# Patient Record
Sex: FEMALE | Race: WHITE | NOT HISPANIC OR LATINO | Employment: FULL TIME | URBAN - NONMETROPOLITAN AREA
[De-identification: names, ages, dates, MRNs, and addresses within clinical notes are randomized per-mention and may not be internally consistent; named-entity substitution may affect disease eponyms.]

---

## 2019-02-17 ENCOUNTER — HOSPITAL ENCOUNTER (EMERGENCY)
Facility: HOSPITAL | Age: 44
Discharge: HOME | End: 2019-02-17
Attending: EMERGENCY MEDICINE | Admitting: EMERGENCY MEDICINE
Payer: COMMERCIAL

## 2019-02-17 VITALS
HEART RATE: 84 BPM | OXYGEN SATURATION: 100 % | SYSTOLIC BLOOD PRESSURE: 106 MMHG | BODY MASS INDEX: 22.2 KG/M2 | WEIGHT: 130 LBS | RESPIRATION RATE: 17 BRPM | HEIGHT: 64 IN | DIASTOLIC BLOOD PRESSURE: 61 MMHG | TEMPERATURE: 98.5 F

## 2019-02-17 DIAGNOSIS — H10.31 ACUTE CONJUNCTIVITIS OF RIGHT EYE, UNSPECIFIED ACUTE CONJUNCTIVITIS TYPE: Primary | ICD-10-CM

## 2019-02-17 DIAGNOSIS — S05.01XA ABRASION OF RIGHT CORNEA, INITIAL ENCOUNTER: ICD-10-CM

## 2019-02-17 PROCEDURE — 99283 EMERGENCY DEPT VISIT LOW MDM: CPT

## 2019-02-17 RX ORDER — TETRACAINE HYDROCHLORIDE 5 MG/ML
2 SOLUTION OPHTHALMIC ONCE
Status: COMPLETED | OUTPATIENT
Start: 2019-02-17 | End: 2019-02-17

## 2019-02-17 RX ORDER — TOBRAMYCIN 3 MG/ML
1 SOLUTION/ DROPS OPHTHALMIC
Qty: 5 ML | Refills: 0 | Status: SHIPPED | OUTPATIENT
Start: 2019-02-17

## 2019-02-17 RX ADMIN — FLUORESCEIN SODIUM 1 STRIP: 1 STRIP OPHTHALMIC at 10:42

## 2019-02-17 RX ADMIN — TETRACAINE HYDROCHLORIDE 2 DROP: 5 SOLUTION OPHTHALMIC at 10:42

## 2019-02-17 NOTE — ED PROVIDER NOTES
History  Chief Complaint   Patient presents with    Eye Pain     Right eye pain and light sensitivity for two days, drainage     Patient presents to the emergency department today for evaluation of right eye symptoms that commenced about 48 hours ago  She complains of feeling a foreign body sensation in the right eye however there is no true history of foreign body  She admits to the eye being generalized blurry  She admits to yellow crusty discharge  She denies any ocular pain or pain with extraocular movements  She is not a contact lens wearer  She has been utilizing an unknown over-the-counter eyedrops  None       History reviewed  No pertinent past medical history  Past Surgical History:   Procedure Laterality Date    TONSILLECTOMY      TUBAL LIGATION         History reviewed  No pertinent family history  I have reviewed and agree with the history as documented  Social History     Tobacco Use    Smoking status: Current Every Day Smoker    Smokeless tobacco: Never Used   Substance Use Topics    Alcohol use: Yes     Comment: rarely    Drug use: Not on file        Review of Systems   Constitutional: Negative  HENT: Negative  Eyes: Positive for photophobia and discharge  Negative for pain, redness, itching and visual disturbance  Respiratory: Negative  Cardiovascular: Negative  Gastrointestinal: Negative  Endocrine: Negative  Genitourinary: Negative  Musculoskeletal: Negative  Skin: Negative  Allergic/Immunologic: Negative  Neurological: Negative  Hematological: Negative  Psychiatric/Behavioral: Negative  All other systems reviewed and are negative  Physical Exam  Physical Exam   Constitutional: She is oriented to person, place, and time  She appears well-developed and well-nourished  No distress  HENT:   Head: Normocephalic     Right Ear: External ear normal    Left Ear: External ear normal    Nose: Nose normal    Mouth/Throat: Oropharynx is clear and moist  No oropharyngeal exudate  Eyes: Pupils are equal, round, and reactive to light  EOM are normal  Right eye exhibits discharge  Left eye exhibits no discharge  No scleral icterus  Extraocular movements intact without pain  There is clear ocular discharge right-sided  There is no scleral injection  No evidence of foreign body on simple examination  Pupils are equal reactive to light and accommodation  No visual field defects are noted  Neck: Normal range of motion  Neck supple  Cardiovascular: Normal rate  Pulmonary/Chest: Effort normal    Musculoskeletal: Normal range of motion  Neurological: She is alert and oriented to person, place, and time  Skin: Skin is warm  Capillary refill takes less than 2 seconds  She is not diaphoretic  Psychiatric: She has a normal mood and affect  Vitals reviewed  Vital Signs  ED Triage Vitals [02/17/19 1017]   Temperature Pulse Respirations Blood Pressure SpO2   98 5 °F (36 9 °C) 78 18 103/68 98 %      Temp Source Heart Rate Source Patient Position - Orthostatic VS BP Location FiO2 (%)   Temporal Monitor Sitting Right arm --      Pain Score       8           Vitals:    02/17/19 1017   BP: 103/68   Pulse: 78   Patient Position - Orthostatic VS: Sitting       Visual Acuity  Visual Acuity      Most Recent Value   Visual acuity R eye is  20/50   Visual acuity Left eye is  20/20   Visual acuity in both eyes is  20/40   Wearing corrective eyewear/lenses?   No   L Pupil Size (mm)  3   R Pupil Size (mm)  3   L Pupil Shape  Round   R Pupil Shape  Round          ED Medications  Medications   tetracaine 0 5 % ophthalmic solution 2 drop (2 drops Right Eye Given by Other 2/17/19 1042)   fluorescein sodium sterile ophthalmic strip 1 strip (1 strip Right Eye Given by Other 2/17/19 1042)       Diagnostic Studies  Results Reviewed     None                 No orders to display              Procedures  Procedures       Phone Contacts  ED Phone Contact    ED Course  ED Course as of Feb 17 1054   Sun Feb 17, 2019   1029 Blood Pressure: 103/68   1029 Temperature: 98 5 °F (36 9 °C)   1029 Pulse: 78   1029 Respirations: 18   1029 SpO2: 98 %   1047 A fluorescein stain exam was completed on the patient's right eye  There was uptake on the sclera in the 10 o'clock position of the right eye  No evidence of ocular foreign bodies  No other corneal abnormalities noted  Stain was washed out with saline  This exam was completed post 2 drops tetracaine administration  Patient tolerated the procedure well  Recommend antibiotic eyedrops as well as over-the-counter lubricant drops follow-up with Ophthalmology within 24 hours  MDM    Disposition  Final diagnoses:   Acute conjunctivitis of right eye, unspecified acute conjunctivitis type   Abrasion of right cornea, initial encounter     Time reflects when diagnosis was documented in both MDM as applicable and the Disposition within this note     Time User Action Codes Description Comment    2/17/2019 10:52 AM Cheryle Legions D Add [H10 31] Acute conjunctivitis of right eye, unspecified acute conjunctivitis type     2/17/2019 10:52 AM Julieta Bauman Add [S05 01XA] Abrasion of right cornea, initial encounter       ED Disposition     ED Disposition Condition Date/Time Comment    Discharge Good Sun Feb 17, 2019 10:52 AM Hadley Gordon discharge to home/self care  Follow-up Information     Follow up With Specialties Details Why Zaira Fine MD Ophthalmology Go in 1 day  [address], [place] [address]  [phone]            Patient's Medications   Discharge Prescriptions    TOBRAMYCIN (TOBREX) 0 3 % SOLN    Administer 1 drop to the right eye every 4 (four) hours while awake       Start Date: 2/17/2019 End Date: --       Order Dose: 1 drop       Quantity: 5 mL    Refills: 0     No discharge procedures on file      ED Provider  Electronically Signed by Kashif Perry PA-C  02/17/19 1053

## 2021-07-27 ENCOUNTER — HOSPITAL ENCOUNTER (EMERGENCY)
Facility: HOSPITAL | Age: 46
Discharge: HOME | End: 2021-07-27
Attending: EMERGENCY MEDICINE
Payer: COMMERCIAL

## 2021-07-27 VITALS
DIASTOLIC BLOOD PRESSURE: 53 MMHG | HEIGHT: 64 IN | SYSTOLIC BLOOD PRESSURE: 93 MMHG | TEMPERATURE: 98 F | HEART RATE: 67 BPM | RESPIRATION RATE: 13 BRPM | WEIGHT: 130.07 LBS | OXYGEN SATURATION: 98 % | BODY MASS INDEX: 22.21 KG/M2

## 2021-07-27 DIAGNOSIS — R51.9 ACUTE NONINTRACTABLE HEADACHE, UNSPECIFIED HEADACHE TYPE: Primary | ICD-10-CM

## 2021-07-27 LAB
EXT PREG TEST URINE: NEGATIVE
EXT. CONTROL ED NAV: NORMAL

## 2021-07-27 PROCEDURE — 93005 ELECTROCARDIOGRAM TRACING: CPT

## 2021-07-27 PROCEDURE — 96365 THER/PROPH/DIAG IV INF INIT: CPT

## 2021-07-27 PROCEDURE — 81025 URINE PREGNANCY TEST: CPT | Performed by: EMERGENCY MEDICINE

## 2021-07-27 PROCEDURE — 99284 EMERGENCY DEPT VISIT MOD MDM: CPT

## 2021-07-27 PROCEDURE — 96375 TX/PRO/DX INJ NEW DRUG ADDON: CPT

## 2021-07-27 PROCEDURE — 99284 EMERGENCY DEPT VISIT MOD MDM: CPT | Performed by: EMERGENCY MEDICINE

## 2021-07-27 PROCEDURE — 96366 THER/PROPH/DIAG IV INF ADDON: CPT

## 2021-07-27 RX ORDER — MAGNESIUM SULFATE HEPTAHYDRATE 40 MG/ML
2 INJECTION, SOLUTION INTRAVENOUS ONCE
Status: COMPLETED | OUTPATIENT
Start: 2021-07-27 | End: 2021-07-27

## 2021-07-27 RX ORDER — ACETAMINOPHEN 325 MG/1
650 TABLET ORAL ONCE
Status: COMPLETED | OUTPATIENT
Start: 2021-07-27 | End: 2021-07-27

## 2021-07-27 RX ORDER — METOCLOPRAMIDE HYDROCHLORIDE 5 MG/ML
10 INJECTION INTRAMUSCULAR; INTRAVENOUS ONCE
Status: COMPLETED | OUTPATIENT
Start: 2021-07-27 | End: 2021-07-27

## 2021-07-27 RX ORDER — KETOROLAC TROMETHAMINE 30 MG/ML
30 INJECTION, SOLUTION INTRAMUSCULAR; INTRAVENOUS ONCE
Status: COMPLETED | OUTPATIENT
Start: 2021-07-27 | End: 2021-07-27

## 2021-07-27 RX ORDER — DIPHENHYDRAMINE HYDROCHLORIDE 50 MG/ML
25 INJECTION INTRAMUSCULAR; INTRAVENOUS ONCE
Status: COMPLETED | OUTPATIENT
Start: 2021-07-27 | End: 2021-07-27

## 2021-07-27 RX ADMIN — METOCLOPRAMIDE HYDROCHLORIDE 10 MG: 5 INJECTION INTRAMUSCULAR; INTRAVENOUS at 14:40

## 2021-07-27 RX ADMIN — DIPHENHYDRAMINE HYDROCHLORIDE 25 MG: 50 INJECTION, SOLUTION INTRAMUSCULAR; INTRAVENOUS at 14:39

## 2021-07-27 RX ADMIN — MAGNESIUM SULFATE HEPTAHYDRATE 2 G: 40 INJECTION, SOLUTION INTRAVENOUS at 14:43

## 2021-07-27 RX ADMIN — ACETAMINOPHEN 650 MG: 325 TABLET, FILM COATED ORAL at 14:36

## 2021-07-27 RX ADMIN — KETOROLAC TROMETHAMINE 30 MG: 30 INJECTION, SOLUTION INTRAMUSCULAR; INTRAVENOUS at 14:44

## 2021-07-27 RX ADMIN — SODIUM CHLORIDE 1000 ML: 0.9 INJECTION, SOLUTION INTRAVENOUS at 14:34

## 2021-07-27 NOTE — DISCHARGE INSTRUCTIONS
Thank you for visiting the Emergency Department today  Your treated for headache emergency department  Her pregnancy test was negative  We treated her headache and symptoms improved  Please follow-up with your PCP for the management of her symptoms  They may refer you to a neurologist as needed if your headaches continue to worsen or become more frequent  Your PCP males discuss abortive medications are maintenance medications which may prevent headache attacks  If her symptoms worsen or you have additional concerns and are unable to follow-up please return to the emergency department sooner

## 2021-07-27 NOTE — ED PROVIDER NOTES
History  Chief Complaint   Patient presents with    Migraine     frontal headache radiating towards right ear/neck ongoing since yesterday; hx of migraines and takes excedrin for relief but not working; photosensitive, nauseated        History provided by:  Patient  Migraine  Location:  Right, retro-orbital, right posterior neck  Quality:  Aching  Severity:  Severe  Onset quality:  Gradual  Duration:  2 days  Timing:  Constant  Progression:  Unchanged  Chronicity:  Recurrent  Context:  Rest  Relieved by:  Nothing  Worsened by:  Light, sound  Ineffective treatments:  Excedrin  Associated symptoms: headaches    Associated symptoms: no abdominal pain, no chest pain, no cough, no ear pain, no fatigue, no fever, no loss of consciousness, no nausea, no rash, no shortness of breath, no sore throat and no vomiting    81-year-old female with past medical history significant for migraine disorder who presents to the ER today for evaluation of headache x2 days  Patient reports gradual onset of headache yesterday  She reports right-sided headache behind the eye radiating around the right side of the head as well as the right posterolateral neck  Denies a history of trauma  Denies any facial swelling, wound, rash  No history of intracranial or neck surgery  No inciting event  Patient reports similar to prior migraines in the past   She notes mild photophobia and phonophobia  She took Excedrin yesterday with minimal relief and reports worsening symptoms today  Denies any new features from her typical migraine presentation  Reports history of negative brain imaging in the past   Does not follow with a neurologist   Denies vomiting  Denies numbness, tingling, weakness  Prior to Admission Medications   Prescriptions Last Dose Informant Patient Reported?  Taking?   tobramycin (TOBREX) 0 3 % SOLN   No No   Sig: Administer 1 drop to the right eye every 4 (four) hours while awake      Facility-Administered Medications: None       History reviewed  No pertinent past medical history  Past Surgical History:   Procedure Laterality Date    TONSILLECTOMY      TUBAL LIGATION         History reviewed  No pertinent family history  I have reviewed and agree with the history as documented  E-Cigarette/Vaping     E-Cigarette/Vaping Substances     Social History     Tobacco Use    Smoking status: Current Every Day Smoker    Smokeless tobacco: Never Used   Substance Use Topics    Alcohol use: Yes     Comment: rarely    Drug use: Not on file       Review of Systems   Constitutional: Negative for chills, fatigue and fever  HENT: Negative for ear pain and sore throat  Eyes: Negative for pain and visual disturbance  Respiratory: Negative for cough and shortness of breath  Cardiovascular: Negative for chest pain and palpitations  Gastrointestinal: Negative for abdominal pain, nausea and vomiting  Genitourinary: Negative for dysuria and hematuria  Musculoskeletal: Negative for arthralgias and back pain  Skin: Negative for color change and rash  Neurological: Positive for headaches  Negative for seizures, loss of consciousness and syncope  All other systems reviewed and are negative  Physical Exam  Physical Exam  Vitals and nursing note reviewed  Exam conducted with a chaperone present  Constitutional:       General: She is not in acute distress  Appearance: She is well-developed  HENT:      Head: Normocephalic and atraumatic  Eyes:      Extraocular Movements: Extraocular movements intact  Conjunctiva/sclera: Conjunctivae normal       Pupils: Pupils are equal, round, and reactive to light  Cardiovascular:      Rate and Rhythm: Normal rate and regular rhythm  Heart sounds: No murmur heard  Pulmonary:      Effort: Pulmonary effort is normal  No respiratory distress  Breath sounds: Normal breath sounds  Abdominal:      Palpations: Abdomen is soft  Tenderness:  There is no abdominal tenderness  Musculoskeletal:      Cervical back: Neck supple  Skin:     General: Skin is warm and dry  Neurological:      General: No focal deficit present  Mental Status: She is alert and oriented to person, place, and time  Vital Signs  ED Triage Vitals [07/27/21 1418]   Temperature Pulse Respirations Blood Pressure SpO2   98 °F (36 7 °C) 74 18 115/81 99 %      Temp Source Heart Rate Source Patient Position - Orthostatic VS BP Location FiO2 (%)   Temporal Monitor Sitting Right arm --      Pain Score       8           Vitals:    07/27/21 1430 07/27/21 1530 07/27/21 1600 07/27/21 1630   BP: 103/50 113/58 90/53 93/53   Pulse: 66 62 66 67   Patient Position - Orthostatic VS: Sitting Sitting Sitting Sitting         Visual Acuity  Visual Acuity      Most Recent Value   L Pupil Size (mm)  2   R Pupil Size (mm)  2          ED Medications  Medications   metoclopramide (REGLAN) injection 10 mg (10 mg Intravenous Given 7/27/21 1440)   diphenhydrAMINE (BENADRYL) injection 25 mg (25 mg Intravenous Given 7/27/21 1439)   acetaminophen (TYLENOL) tablet 650 mg (650 mg Oral Given 7/27/21 1436)   sodium chloride 0 9 % bolus 1,000 mL (0 mL Intravenous Stopped 7/27/21 1553)   magnesium sulfate 2 g/50 mL IVPB (premix) 2 g (2 g Intravenous New Bag 7/27/21 1443)   ketorolac (TORADOL) injection 30 mg (30 mg Intravenous Given 7/27/21 1444)       Diagnostic Studies  Results Reviewed     Procedure Component Value Units Date/Time    POCT pregnancy, urine [[id number]]  (Normal) Resulted: 07/27/21 1433    Lab Status: Final result Updated: 07/27/21 1433     EXT PREG TEST UR (Ref: Negative) negative     Control valid                 No orders to display              Procedures  Procedures         ED Course                             SBIRT 22yo+      Most Recent Value   SBIRT (24 yo +)   In order to provide better care to our patients, we are screening all of our patients for alcohol and drug use   Would it be okay to ask you these screening questions? Yes Filed at: 07/27/2021 1427   Initial Alcohol Screen: US AUDIT-C    1  How often do you have a drink containing alcohol?  0 Filed at: 07/27/2021 1427   2  How many drinks containing alcohol do you have on a typical day you are drinking? 0 Filed at: 07/27/2021 1427   3b  FEMALE Any Age, or MALE 65+: How often do you have 4 or more drinks on one occassion? 0 Filed at: 07/27/2021 1427   Audit-C Score  0 Filed at: 07/27/2021 1427   RAMILA: How many times in the past year have you    Used an illegal drug or used a prescription medication for non-medical reasons? Never Filed at: 07/27/2021 1427                    [facility]  Number of Diagnoses or Management Options     Amount and/or Complexity of Data Reviewed  Clinical lab tests: ordered and reviewed  Review and summarize past medical records: yes    Risk of Complications, Morbidity, and/or Mortality  Presenting problems: moderate  Diagnostic procedures: low  Management options: low    Patient Progress  Patient progress: stable    43-year-old female presenting for evaluation of headache  History of migraines, presentation consistent with her prior migraine history  No abnormal neuro findings on exam     Will proceed with urine pregnancy and symptomatic treatment with migraine cocktail  Patient's allergies reviewed no allergies  Will treat and monitor for improvement her symptoms and likely discharge with outpatient follow-up    I do not feel this patient needs imaging of the brain at this time given her history of similar symptoms in the past and this presentation being consistent with her known migraine history and no atypical findings on exam   Disposition  Final diagnoses:   Acute nonintractable headache, unspecified headache type     Time reflects when diagnosis was documented in both MDM as applicable and the Disposition within this note     Time User Action Codes Description Comment    7/27/2021  2:31 PM Maryann Coronado Add [R51 9] Acute nonintractable headache, unspecified headache type       ED Disposition     ED Disposition Condition Date/Time Comment    Discharge Stable Tue Jul 27, 2021  4:46 PM oRnald Boyle discharge to home/self care  Follow-up Information     Follow up With Specialties Details Why Contact Info Additional Information    Keyla Espinoza DO Family Medicine Schedule an appointment as soon as possible for a visit   Chaunce[address] [address] [facility] [phone]       [facility] Emergency Department Emergency Medicine Go to  If symptoms worsen [phone]  [address] [place] [address] Emergency Department[address], [facility]          Patient's Medications   Discharge Prescriptions    No medications on file     No discharge procedures on file      PDMP Review     None          ED Provider  Electronically Signed by           Talha Mckenzie DO  07/27/21 7004

## 2021-07-28 LAB
ATRIAL RATE: 72 BPM
P AXIS: 84 DEGREES
PR INTERVAL: 162 MS
QRS AXIS: 72 DEGREES
QRSD INTERVAL: 72 MS
QT INTERVAL: 390 MS
QTC INTERVAL: 427 MS
T WAVE AXIS: 61 DEGREES
VENTRICULAR RATE: 72 BPM

## 2021-07-28 PROCEDURE — 93010 ELECTROCARDIOGRAM REPORT: CPT | Performed by: INTERNAL MEDICINE

## 2022-04-20 ENCOUNTER — OFFICE VISIT (OUTPATIENT)
Dept: URGENT CARE | Facility: MEDICAL CENTER | Age: 47
End: 2022-04-20
Payer: COMMERCIAL

## 2022-04-20 VITALS
WEIGHT: 112.8 LBS | DIASTOLIC BLOOD PRESSURE: 70 MMHG | BODY MASS INDEX: 19.26 KG/M2 | HEART RATE: 100 BPM | OXYGEN SATURATION: 96 % | RESPIRATION RATE: 18 BRPM | HEIGHT: 64 IN | TEMPERATURE: 97.2 F | SYSTOLIC BLOOD PRESSURE: 102 MMHG

## 2022-04-20 DIAGNOSIS — R11.0 NAUSEA: ICD-10-CM

## 2022-04-20 DIAGNOSIS — K04.7 DENTAL ABSCESS: Primary | ICD-10-CM

## 2022-04-20 PROCEDURE — 99212 OFFICE O/P EST SF 10 MIN: CPT | Performed by: PHYSICIAN ASSISTANT

## 2022-04-20 RX ORDER — ONDANSETRON 4 MG/1
4 TABLET, ORALLY DISINTEGRATING ORAL EVERY 6 HOURS PRN
Qty: 15 TABLET | Refills: 0 | Status: SHIPPED | OUTPATIENT
Start: 2022-04-20

## 2022-04-20 RX ORDER — AMOXICILLIN 875 MG/1
875 TABLET, COATED ORAL 2 TIMES DAILY
Qty: 20 TABLET | Refills: 0 | Status: SHIPPED | OUTPATIENT
Start: 2022-04-20 | End: 2022-04-30

## 2022-04-20 NOTE — PROGRESS NOTES
[facility]        NAME: Gerardo Silva is a 40 y o  female  : 1975    MRN: [id number]  DATE: 2022  TIME: 1:17 PM    Assessment and Plan   Dental abscess [K04 7]  1  Dental abscess  amoxicillin (AMOXIL) 875 mg tablet   2  Nausea  ondansetron (ZOFRAN-ODT) 4 mg disintegrating tablet         Patient Instructions     Start amoxicillin as prescribed  Take Zofran every 6 hours as needed for nausea  Tylenol Motrin as needed for pain  Follow-up with a dentist as soon as possible  Follow up with PCP in 3-5 days  Proceed to  ER if symptoms worsen  Chief Complaint     Chief Complaint   Patient presents with    Dental Pain     right upper jaw pain, swelling, dental abcess, pressure and pain into right side of face and ear, nausea, denies drainiage from abces         History of Present Illness       Patient presents with a 2 day history of right upper dental pain  Today she noticed a swelling her right cheek with radiation of pain into her right ear  No fever chills or drainage from the abscess  Does not have an upcoming dental appointment  She is also having nausea on unable to eat or drink secondary to the nausea  Denies any chance of pregnancy  Review of Systems   Review of Systems   Constitutional: Negative for chills and fever  HENT: Positive for dental problem  Negative for trouble swallowing  Hematological: Negative for adenopathy           Current Medications       Current Outpatient Medications:     amoxicillin (AMOXIL) 875 mg tablet, Take 1 tablet (875 mg total) by mouth 2 (two) times a day for 10 days, Disp: 20 tablet, Rfl: 0    ondansetron (ZOFRAN-ODT) 4 mg disintegrating tablet, Take 1 tablet (4 mg total) by mouth every 6 (six) hours as needed for nausea or vomiting, Disp: 15 tablet, Rfl: 0    tobramycin (TOBREX) 0 3 % SOLN, Administer 1 drop to the right eye every 4 (four) hours while awake, Disp: 5 mL, Rfl: 0    Current Allergies     Allergies as of 2022  (No Known Allergies)            The following portions of the patient's history were reviewed and updated as appropriate: allergies, current medications, past family history, past medical history, past social history, past surgical history and problem list      History reviewed  No pertinent past medical history  Past Surgical History:   Procedure Laterality Date    TONSILLECTOMY      TUBAL LIGATION         History reviewed  No pertinent family history  Medications have been verified  Objective   /70   Pulse 100   Temp (!) 97 2 °F (36 2 °C)   Resp 18   Ht 5' 4" (1 626 m)   Wt 51 2 kg (112 lb 12 8 oz)   LMP  (LMP Unknown)   SpO2 96%   BMI 19 36 kg/m²   No LMP recorded (lmp unknown)  Physical Exam     Physical Exam  Vitals and nursing note reviewed  Constitutional:       Appearance: Normal appearance  HENT:      Head: Normocephalic  Comments: Swelling and tenderness right maxillary area     Mouth/Throat:      Mouth: Mucous membranes are moist       Pharynx: Oropharynx is clear  Comments: Right upper molar with decay to gumline, surrounding swelling and tenderness to the gingiva  Cardiovascular:      Rate and Rhythm: Normal rate and regular rhythm  Pulmonary:      Effort: Pulmonary effort is normal    Skin:     General: Skin is warm  Neurological:      Mental Status: She is alert

## 2025-05-21 ENCOUNTER — OFFICE VISIT (OUTPATIENT)
Dept: URGENT CARE | Facility: MEDICAL CENTER | Age: 50
End: 2025-05-21
Payer: COMMERCIAL

## 2025-05-21 VITALS
SYSTOLIC BLOOD PRESSURE: 110 MMHG | HEART RATE: 89 BPM | BODY MASS INDEX: 19.22 KG/M2 | OXYGEN SATURATION: 99 % | WEIGHT: 112 LBS | RESPIRATION RATE: 20 BRPM | DIASTOLIC BLOOD PRESSURE: 66 MMHG | TEMPERATURE: 100.6 F

## 2025-05-21 DIAGNOSIS — L03.039 PARONYCHIA OF GREAT TOE: ICD-10-CM

## 2025-05-21 DIAGNOSIS — B34.9 VIRAL INFECTION: ICD-10-CM

## 2025-05-21 DIAGNOSIS — B34.9 VIRAL INFECTION: Primary | ICD-10-CM

## 2025-05-21 PROCEDURE — S9083 URGENT CARE CENTER GLOBAL: HCPCS

## 2025-05-21 PROCEDURE — 87636 SARSCOV2 & INF A&B AMP PRB: CPT

## 2025-05-21 PROCEDURE — 10060 I&D ABSCESS SIMPLE/SINGLE: CPT

## 2025-05-21 PROCEDURE — G0383 LEV 4 HOSP TYPE B ED VISIT: HCPCS

## 2025-05-21 RX ORDER — DOXYCYCLINE 100 MG/1
100 TABLET ORAL 2 TIMES DAILY
Qty: 10 TABLET | Refills: 0 | Status: SHIPPED | OUTPATIENT
Start: 2025-05-21 | End: 2025-05-26

## 2025-05-21 NOTE — LETTER
May 21, 2025     Patient: Lulú Brenner   YOB: 1975   Date of Visit: 5/21/2025       To Whom it May Concern:    Lulú Brenner was seen in my clinic on 5/21/2025. She may return to work on 5/23/25 or 24 hours fever free.    If you have any questions or concerns, please don't hesitate to call.         Sincerely,          Sandro Childers PA-C        CC: Keyla Espinoza,

## 2025-05-21 NOTE — PATIENT INSTRUCTIONS
Doxycycline may cause your skin to be more sensitive to sunlight than it is normally. Exposure to sunlight, even for short periods of time, may cause skin rash, itching, redness or other discoloration of the skin, or a severe sunburn. Practice proper precautions including avoiding excessive sunlight exposure, wear skin protection including clothing and sunscreen to avoid reaction.     Viral symptoms may last for a total of 1-3 weeks. Symptoms typically start with a sore throat and progress to include sinus pain/pressure, runny nose (yellow/green), and congestion/cough. The yellow/green color does not necessarily indicate a bacterial sinus infection. If your sinus symptoms worsen on day 10-14, you may want to consider re-evaluation for a possible secondary bacterial sinus infection. Coughs are typically most bothersome the first 1-2 weeks. Coughs frequently linger for 4-6 weeks. However, have your lungs re-evaluated if you develop sudden worsening cough, shortness or breath or chest discomfort.       Vitamin D3 2000 IU daily  Vitamin C 1000mg twice per day  Some studies suggest that Zinc 12.5-15mg every 2 hours while awake x 5 days may shorten the duration cold symptoms by 1-2 days.   Fluids and rest  Nasal saline spray (Extra Strength) 3 drops in each nostril 4x per day may shorten the duration of illness by 1-2 days and help prevent spread.  Afrin if severe congestion (do not use for more than 3 days)  Over the counter cold medication as needed (EX: Mucinex DM, Sudafed I.e. pseudoephedrine, Tylenol, Flonase)  Sinus Rinses (use distilled water only and carefully follow instructions)  Salt water gargles and chloraseptic spray  Throat Coat Tea (herbal licorice root tea for sore throat-add honey unless diabetic)  Warm compresses over sinuses  Steam treatment (utilize proper safety precautions when in contact with hot water/steam)

## 2025-05-21 NOTE — PROGRESS NOTES
[facility]  Name: Lulú Brenner      : 1975      MRN: [id number]  Encounter Provider: Sandro Childers PA-C  Encounter Date: 2025   Encounter department: [facility] [place]  :  Assessment & Plan  Viral infection    Orders:    Covid/Flu- Office Collect Normal; Future    Paronychia of great toe    Orders:    doxycycline (ADOXA) 100 MG tablet; Take 1 tablet (100 mg total) by mouth 2 (two) times a day for 5 days    Discussed with patient treatment plan about her toe and requested that we relieve the pressure and drain what we can of the toe.  Needle puncture and drainage in office performed with scant to mild drainage.  Manipulated through wound until dry with manipulation.  No more drainage expressed on exam patient does note some the pressure has gone down.  Given no purulent drainage, erring the side of caution placed on doxycycline for 5 days to further treat infection.  Caution patient on doxycycline side effects as well as signs of worsening infection.  COVID flu testing done in office and will follow-up with those results as needed.  In the meantime further recommended patient be doing ibuprofen and acetaminophen alternating like she was prior.  Patient Instructions  Follow up with PCP in 3-5 days.  Proceed to  ER if symptoms worsen.    If tests are performed, our office will contact you with results only if changes need to made to the care plan discussed with you at the visit. [facility]    Chief Complaint:   Chief Complaint   Patient presents with    Influenza     Began with body aches,fever and chills yesterday, exposed to flu 6 days ago.Pt also has a sore right great toe for past 4 days, denies injury but pain is 5/10.     History of Present Illness     49-year-old female presenting with 2 complaints.  Primarily stating that the last 4 days she has been having pain to her right big toe around the cuticle with some mild redness and  swelling.  Denies ever having before.  Denies any trauma or injury to that site.  Keeping it well-controlled with just basic Epsom salts warm soaks and icing it when needed.  However yesterday she worked in steel toe boots and believes that she aggravated it.  Since then it has been increasingly swollen, painful and feels like there is a lot of pressure around the skin of the big toe.  No drainage expressed on exam.  Denies any numbness or tingling.  States that before the pain even started she started to feel body aches, chills fatigue and fevers at home.  Reports she was exposed to flu about 5 or 6 days ago from her grandson when he was acutely ill with the flu.  Feels like the flu from previous incidence.  Did not get the flu shot this year.  Denies any cough, congestion or runny nose or sore throat.  Patient does note a headache however she normally has a headache which is a chronic issue for her, denies any acute changes to regular headaches.  Was taking ibuprofen and acetaminophen alternating for the toe pain originally but stopped since it was not helping too much.  Trying to stay hydrated.            Review of Systems   Constitutional:  Positive for chills, fatigue and fever.   HENT:  Negative for congestion, ear pain and sore throat.    Eyes:  Negative for discharge and redness.   Respiratory:  Negative for cough, chest tightness and shortness of breath.    Cardiovascular:  Negative for chest pain and palpitations.   Gastrointestinal:  Negative for abdominal pain, nausea and vomiting.   Musculoskeletal:  Positive for arthralgias and myalgias.   Skin:  Positive for color change.   Neurological:  Positive for headaches. Negative for dizziness and light-headedness.   Psychiatric/Behavioral:  Negative for confusion.      Past Medical History   History reviewed. No pertinent past medical history.  Past Surgical History:   Procedure Laterality Date    TONSILLECTOMY      TUBAL LIGATION       History reviewed. No  pertinent family history.  she reports that she has been smoking cigarettes. She has never used smokeless tobacco. She reports current alcohol use. She reports that she does not use drugs.  Current Outpatient Medications   Medication Instructions    doxycycline (ADOXA) 100 mg, Oral, 2 times daily    ondansetron (ZOFRAN-ODT) 4 mg, Oral, Every 6 hours PRN    tobramycin (TOBREX) 0.3 % SOLN 1 drop, Right Eye, Every 4 hours while awake   Allergies[1]     Objective   /66 (BP Location: Left arm, Patient Position: Sitting, Cuff Size: Standard)   Pulse 89   Temp (!) 100.6 °F (38.1 °C) (Temporal)   Resp 20   Wt 50.8 kg (112 lb)   LMP  (LMP Unknown)   SpO2 99%   BMI 19.22 kg/m²      Physical Exam  Vitals and nursing note reviewed.   Constitutional:       General: She is not in acute distress.     Appearance: She is normal weight. She is ill-appearing.   HENT:      Head: Normocephalic and atraumatic.      Right Ear: Tympanic membrane, ear canal and external ear normal.      Left Ear: Tympanic membrane, ear canal and external ear normal.      Nose: Nose normal. No congestion.      Mouth/Throat:      Mouth: Mucous membranes are moist.      Pharynx: Oropharynx is clear. No oropharyngeal exudate.     Eyes:      General:         Right eye: No discharge.         Left eye: No discharge.      Conjunctiva/sclera: Conjunctivae normal.      Pupils: Pupils are equal, round, and reactive to light.       Cardiovascular:      Rate and Rhythm: Normal rate and regular rhythm.      Pulses: Normal pulses.      Heart sounds: Normal heart sounds.   Pulmonary:      Effort: Pulmonary effort is normal. No respiratory distress.      Breath sounds: Normal breath sounds. No wheezing.   Abdominal:      General: Abdomen is flat. Bowel sounds are normal.      Tenderness: There is no abdominal tenderness.     Musculoskeletal:         General: Normal range of motion.   Lymphadenopathy:      Cervical: No cervical adenopathy.     Skin:     General:  "Skin is warm and dry.      Capillary Refill: Capillary refill takes less than 2 seconds.      Comments: Visible swelling and redness to the bottom right quarter of skin around cuticle on the big toe of right foot about 1 cm in diameter.  Mild fluctuance with no fluid expressed on exam basic palpation.  TTP     Neurological:      General: No focal deficit present.      Mental Status: She is alert and oriented to person, place, and time.      Sensory: No sensory deficit.      Motor: No weakness.      Gait: Gait normal.     Psychiatric:         Mood and Affect: Mood normal.         Behavior: Behavior normal.         Incision and drain    Date/Time: 5/21/2025 12:30 PM    Performed by: Sandro Childers PA-C  Authorized by: Sandro Childers PA-C    Universal Protocol:  procedure performed by consultantConsent: Verbal consent obtained  Risks and benefits: risks, benefits and alternatives were discussed  Consent given by: patient  Time out: Immediately prior to procedure a \"time out\" was called to verify the correct patient, procedure, equipment, support staff and site/side marked as required.  Timeout called at: 5/21/2025 1:57 PM.  Patient understanding: patient states understanding of the procedure being performed  Patient consent: the patient's understanding of the procedure matches consent given  Procedure consent: procedure consent matches procedure scheduled  Relevant documents: relevant documents present and verified  Test results: test results available and properly labeled  Site marked: the operative site was marked  Radiology Images displayed and confirmed. If images not available, report reviewed: imaging studies available  Patient identity confirmed: verbally with patient    Patient location:  Clinic  Location:     Type:  Abscess    Size:  1 cm diameter    Location:  Lower extremity    Lower extremity location:  R big toe  Pre-procedure details:     Skin preparation:  Alcohol wipe  Anesthesia (see MAR for exact " "dosages):     Anesthesia method:  Topical application  Procedure details:     Complexity:  Simple    Needle aspiration: no      Incision types:  Stab incision    Approach:  Puncture    Incision depth:  Dermal    Drainage:  Bloody and serosanguinous    Drainage amount:  Scant    Wound treatment:  Wound left open    Packing materials:  None  Post-procedure details:     Patient tolerance of procedure:  Tolerated well, no immediate complications        Portions of the record may have been created with voice recognition software.  Occasional wrong word or \"sound a like\" substitutions may have occurred due to the inherent limitations of voice recognition software.  Read the chart carefully and recognize, using context, where substitutions have occurred.       [1] No Known Allergies    "

## 2025-05-22 ENCOUNTER — RESULTS FOLLOW-UP (OUTPATIENT)
Dept: URGENT CARE | Facility: MEDICAL CENTER | Age: 50
End: 2025-05-22

## 2025-05-22 LAB
FLUAV RNA RESP QL NAA+PROBE: NEGATIVE
FLUBV RNA RESP QL NAA+PROBE: NEGATIVE
SARS-COV-2 RNA RESP QL NAA+PROBE: POSITIVE

## 2025-07-22 ENCOUNTER — OFFICE VISIT (OUTPATIENT)
Dept: URGENT CARE | Facility: MEDICAL CENTER | Age: 50
End: 2025-07-22
Payer: COMMERCIAL

## 2025-07-22 VITALS
HEART RATE: 75 BPM | WEIGHT: 114 LBS | OXYGEN SATURATION: 99 % | SYSTOLIC BLOOD PRESSURE: 98 MMHG | BODY MASS INDEX: 19.57 KG/M2 | DIASTOLIC BLOOD PRESSURE: 62 MMHG | TEMPERATURE: 97.4 F

## 2025-07-22 DIAGNOSIS — H57.89 IRRITATION OF LEFT EYE: Primary | ICD-10-CM

## 2025-07-22 PROCEDURE — S9083 URGENT CARE CENTER GLOBAL: HCPCS | Performed by: PHYSICIAN ASSISTANT

## 2025-07-22 PROCEDURE — G0382 LEV 3 HOSP TYPE B ED VISIT: HCPCS | Performed by: PHYSICIAN ASSISTANT

## 2025-07-22 RX ORDER — FLUORESCEIN SODIUM 1 MG/MG
1 STRIP OPHTHALMIC ONCE
Status: COMPLETED | OUTPATIENT
Start: 2025-07-22 | End: 2025-07-22

## 2025-07-22 RX ORDER — TETRACAINE HYDROCHLORIDE 5 MG/ML
2 SOLUTION OPHTHALMIC ONCE
Status: COMPLETED | OUTPATIENT
Start: 2025-07-22 | End: 2025-07-22

## 2025-07-22 RX ADMIN — TETRACAINE HYDROCHLORIDE 2 DROP: 5 SOLUTION OPHTHALMIC at 19:04

## 2025-07-22 RX ADMIN — FLUORESCEIN SODIUM 1 STRIP: 1 STRIP OPHTHALMIC at 19:04

## 2025-07-22 NOTE — PROGRESS NOTES
"[facility]  Name: Lulú Brenner      : 1975      MRN: [id number]  Encounter Provider: Seema Harrison PA-C  Encounter Date: 2025   Encounter department: [facility] [place]  :  Assessment & Plan  Irritation of left eye    Orders:    tetracaine 0.5 % ophthalmic solution 2 drop    fluorescein sodium sterile 1 mg ophthalmic strip 1 strip        Patient Instructions  Use Systane Ultra to soothe the eyes.  Try not to rub eyes  If symptoms fail to improve follow-up with an eye doctor    Blu Edmund [address]  [facility] [address]    [phone]    Follow up with PCP in 3-5 days.  Proceed to  ER if symptoms worsen.    If tests are performed, our office will contact you with results only if changes need to made to the care plan discussed with you at the visit. [facility]    Chief Complaint:   Chief Complaint   Patient presents with    Pain     Located in left eye.  Started in the middle of night.  Described as \"hurting\" and \"scratchy\"   Denies purulent drainage.  Denies injury     History of Present Illness   Patient presents with left eye irritation which started in the middle of the night.  No known trauma or foreign body.  She had slight tearing earlier today.  Slight sensitivity to light.          Review of Systems   Constitutional:  Negative for fever.   Eyes:  Positive for photophobia, pain and redness. Negative for discharge and visual disturbance.     Past Medical History   Past Medical History[1]  Past Surgical History[2]  Family History[3]  she reports that she has been smoking cigarettes. She has never used smokeless tobacco. She reports current alcohol use. She reports that she does not use drugs.  Current Outpatient Medications   Medication Instructions    ondansetron (ZOFRAN-ODT) 4 mg, Oral, Every 6 hours PRN    tobramycin (TOBREX) 0.3 % SOLN 1 drop, Right Eye, Every 4 hours while awake   Allergies[4] " "    Objective   BP 98/62   Pulse 75   Temp (!) 97.4 °F (36.3 °C)   Wt 51.7 kg (114 lb)   SpO2 99%   BMI 19.57 kg/m²      Physical Exam  Vitals and nursing note reviewed.   Constitutional:       Appearance: Normal appearance.   HENT:      Head: Normocephalic and atraumatic.     Eyes:      General: Lids are normal. Lids are everted, no foreign bodies appreciated. Vision grossly intact.         Left eye: No foreign body, discharge or hordeolum.      Extraocular Movements: Extraocular movements intact.      Conjunctiva/sclera:      Left eye: Left conjunctiva is injected. No exudate or hemorrhage.     Comments: Fluorescein stain negative  Left eye dry     Cardiovascular:      Rate and Rhythm: Normal rate and regular rhythm.      Heart sounds: Normal heart sounds.   Pulmonary:      Effort: Pulmonary effort is normal.      Breath sounds: Normal breath sounds.     Skin:     General: Skin is warm.     Neurological:      Mental Status: She is alert.         Portions of the record may have been created with voice recognition software.  Occasional wrong word or \"sound a like\" substitutions may have occurred due to the inherent limitations of voice recognition software.  Read the chart carefully and recognize, using context, where substitutions have occurred.         [1] No past medical history on file.  [2]   Past Surgical History:  Procedure Laterality Date    TONSILLECTOMY      TUBAL LIGATION     [3] No family history on file.  [4] No Known Allergies    "

## 2025-07-22 NOTE — PATIENT INSTRUCTIONS
Use Systane Ultra to soothe the eyes.  Try not to rub eyes  If symptoms fail to improve follow-up with an eye doctor    Blu Shaffer [address] [address] [phone]